# Patient Record
Sex: FEMALE | Race: WHITE | NOT HISPANIC OR LATINO | URBAN - METROPOLITAN AREA
[De-identification: names, ages, dates, MRNs, and addresses within clinical notes are randomized per-mention and may not be internally consistent; named-entity substitution may affect disease eponyms.]

---

## 2018-04-09 ENCOUNTER — EMERGENCY (EMERGENCY)
Facility: HOSPITAL | Age: 83
LOS: 1 days | Discharge: HOME | End: 2018-04-09
Attending: EMERGENCY MEDICINE | Admitting: EMERGENCY MEDICINE
Payer: MEDICARE

## 2018-04-09 ENCOUNTER — INPATIENT (INPATIENT)
Facility: HOSPITAL | Age: 83
LOS: 1 days | Discharge: HOME | DRG: 948 | End: 2018-04-11
Attending: INTERNAL MEDICINE | Admitting: HOSPITALIST
Payer: MEDICARE

## 2018-04-09 VITALS
HEIGHT: 60 IN | HEART RATE: 86 BPM | SYSTOLIC BLOOD PRESSURE: 156 MMHG | OXYGEN SATURATION: 99 % | TEMPERATURE: 98 F | WEIGHT: 169.98 LBS | RESPIRATION RATE: 18 BRPM | DIASTOLIC BLOOD PRESSURE: 89 MMHG

## 2018-04-09 VITALS
RESPIRATION RATE: 17 BRPM | HEART RATE: 100 BPM | DIASTOLIC BLOOD PRESSURE: 74 MMHG | OXYGEN SATURATION: 98 % | SYSTOLIC BLOOD PRESSURE: 132 MMHG

## 2018-04-09 VITALS
OXYGEN SATURATION: 100 % | RESPIRATION RATE: 18 BRPM | DIASTOLIC BLOOD PRESSURE: 82 MMHG | SYSTOLIC BLOOD PRESSURE: 137 MMHG | TEMPERATURE: 98 F | HEART RATE: 98 BPM

## 2018-04-09 LAB
ALBUMIN SERPL ELPH-MCNC: 4 G/DL — SIGNIFICANT CHANGE UP (ref 3.3–5.2)
ALBUMIN SERPL ELPH-MCNC: 4.1 G/DL — SIGNIFICANT CHANGE UP (ref 3.3–5.2)
ALP SERPL-CCNC: 75 U/L — SIGNIFICANT CHANGE UP (ref 40–120)
ALP SERPL-CCNC: 75 U/L — SIGNIFICANT CHANGE UP (ref 40–120)
ALT FLD-CCNC: 12 U/L — SIGNIFICANT CHANGE UP
ALT FLD-CCNC: 13 U/L — SIGNIFICANT CHANGE UP
ANION GAP SERPL CALC-SCNC: 16 MMOL/L — SIGNIFICANT CHANGE UP (ref 5–17)
ANION GAP SERPL CALC-SCNC: 19 MMOL/L — HIGH (ref 5–17)
ANISOCYTOSIS BLD QL: SLIGHT — SIGNIFICANT CHANGE UP
APTT BLD: 147.8 SEC — CRITICAL HIGH (ref 27.5–37.4)
APTT BLD: 162.8 SEC — CRITICAL HIGH (ref 27.5–37.4)
AST SERPL-CCNC: 18 U/L — SIGNIFICANT CHANGE UP
AST SERPL-CCNC: 23 U/L — SIGNIFICANT CHANGE UP
BASOPHILS # BLD AUTO: 0 K/UL — SIGNIFICANT CHANGE UP (ref 0–0.2)
BASOPHILS # BLD AUTO: 0 K/UL — SIGNIFICANT CHANGE UP (ref 0–0.2)
BASOPHILS NFR BLD AUTO: 0.2 % — SIGNIFICANT CHANGE UP (ref 0–2)
BASOPHILS NFR BLD AUTO: 0.2 % — SIGNIFICANT CHANGE UP (ref 0–2)
BILIRUB SERPL-MCNC: 1.3 MG/DL — SIGNIFICANT CHANGE UP (ref 0.4–2)
BILIRUB SERPL-MCNC: 1.6 MG/DL — SIGNIFICANT CHANGE UP (ref 0.4–2)
BLD GP AB SCN SERPL QL: SIGNIFICANT CHANGE UP
BUN SERPL-MCNC: 15 MG/DL — SIGNIFICANT CHANGE UP (ref 8–20)
BUN SERPL-MCNC: 18 MG/DL — SIGNIFICANT CHANGE UP (ref 8–20)
CALCIUM SERPL-MCNC: 9.3 MG/DL — SIGNIFICANT CHANGE UP (ref 8.6–10.2)
CALCIUM SERPL-MCNC: 9.5 MG/DL — SIGNIFICANT CHANGE UP (ref 8.6–10.2)
CHLORIDE SERPL-SCNC: 94 MMOL/L — LOW (ref 98–107)
CHLORIDE SERPL-SCNC: 97 MMOL/L — LOW (ref 98–107)
CK SERPL-CCNC: 92 U/L — SIGNIFICANT CHANGE UP (ref 25–170)
CO2 SERPL-SCNC: 24 MMOL/L — SIGNIFICANT CHANGE UP (ref 22–29)
CO2 SERPL-SCNC: 26 MMOL/L — SIGNIFICANT CHANGE UP (ref 22–29)
CREAT SERPL-MCNC: 0.62 MG/DL — SIGNIFICANT CHANGE UP (ref 0.5–1.3)
CREAT SERPL-MCNC: 0.71 MG/DL — SIGNIFICANT CHANGE UP (ref 0.5–1.3)
EOSINOPHIL # BLD AUTO: 0 K/UL — SIGNIFICANT CHANGE UP (ref 0–0.5)
EOSINOPHIL # BLD AUTO: 0 K/UL — SIGNIFICANT CHANGE UP (ref 0–0.5)
EOSINOPHIL NFR BLD AUTO: 0.2 % — SIGNIFICANT CHANGE UP (ref 0–5)
EOSINOPHIL NFR BLD AUTO: 0.4 % — SIGNIFICANT CHANGE UP (ref 0–6)
GLUCOSE SERPL-MCNC: 160 MG/DL — HIGH (ref 70–115)
GLUCOSE SERPL-MCNC: 164 MG/DL — HIGH (ref 70–115)
HCT VFR BLD CALC: 35.3 % — LOW (ref 37–47)
HCT VFR BLD CALC: 37.2 % — SIGNIFICANT CHANGE UP (ref 37–47)
HGB BLD-MCNC: 11.7 G/DL — LOW (ref 12–16)
HGB BLD-MCNC: 12.1 G/DL — SIGNIFICANT CHANGE UP (ref 12–16)
INR BLD: >15 RATIO — CRITICAL HIGH (ref 0.88–1.16)
INR BLD: >15 RATIO — SIGNIFICANT CHANGE UP (ref 0.88–1.16)
LYMPHOCYTES # BLD AUTO: 0.8 K/UL — LOW (ref 1–4.8)
LYMPHOCYTES # BLD AUTO: 1 K/UL — SIGNIFICANT CHANGE UP (ref 1–4.8)
LYMPHOCYTES # BLD AUTO: 6.3 % — LOW (ref 20–55)
LYMPHOCYTES # BLD AUTO: 9.4 % — LOW (ref 20–55)
MACROCYTES BLD QL: SLIGHT — SIGNIFICANT CHANGE UP
MAGNESIUM SERPL-MCNC: 1.9 MG/DL — SIGNIFICANT CHANGE UP (ref 1.6–2.6)
MCHC RBC-ENTMCNC: 30 PG — SIGNIFICANT CHANGE UP (ref 27–31)
MCHC RBC-ENTMCNC: 30.3 PG — SIGNIFICANT CHANGE UP (ref 27–31)
MCHC RBC-ENTMCNC: 32.5 G/DL — SIGNIFICANT CHANGE UP (ref 32–36)
MCHC RBC-ENTMCNC: 33.1 G/DL — SIGNIFICANT CHANGE UP (ref 32–36)
MCV RBC AUTO: 91.5 FL — SIGNIFICANT CHANGE UP (ref 81–99)
MCV RBC AUTO: 92.3 FL — SIGNIFICANT CHANGE UP (ref 81–99)
MICROCYTES BLD QL: SLIGHT — SIGNIFICANT CHANGE UP
MONOCYTES # BLD AUTO: 0.9 K/UL — HIGH (ref 0–0.8)
MONOCYTES # BLD AUTO: 0.9 K/UL — HIGH (ref 0–0.8)
MONOCYTES NFR BLD AUTO: 7 % — SIGNIFICANT CHANGE UP (ref 3–10)
MONOCYTES NFR BLD AUTO: 9.3 % — SIGNIFICANT CHANGE UP (ref 3–10)
NEUTROPHILS # BLD AUTO: 11.3 K/UL — HIGH (ref 1.8–8)
NEUTROPHILS # BLD AUTO: 8.2 K/UL — HIGH (ref 1.8–8)
NEUTROPHILS NFR BLD AUTO: 80.4 % — HIGH (ref 37–73)
NEUTROPHILS NFR BLD AUTO: 86.1 % — HIGH (ref 37–73)
NT-PROBNP SERPL-SCNC: 661 PG/ML — HIGH (ref 0–300)
OVALOCYTES BLD QL SMEAR: SLIGHT — SIGNIFICANT CHANGE UP
PLAT MORPH BLD: NORMAL — SIGNIFICANT CHANGE UP
PLATELET # BLD AUTO: 226 K/UL — SIGNIFICANT CHANGE UP (ref 150–400)
PLATELET # BLD AUTO: 236 K/UL — SIGNIFICANT CHANGE UP (ref 150–400)
POIKILOCYTOSIS BLD QL AUTO: SLIGHT — SIGNIFICANT CHANGE UP
POTASSIUM SERPL-MCNC: 3.4 MMOL/L — LOW (ref 3.5–5.3)
POTASSIUM SERPL-MCNC: 3.6 MMOL/L — SIGNIFICANT CHANGE UP (ref 3.5–5.3)
POTASSIUM SERPL-SCNC: 3.4 MMOL/L — LOW (ref 3.5–5.3)
POTASSIUM SERPL-SCNC: 3.6 MMOL/L — SIGNIFICANT CHANGE UP (ref 3.5–5.3)
PROT SERPL-MCNC: 7 G/DL — SIGNIFICANT CHANGE UP (ref 6.6–8.7)
PROT SERPL-MCNC: 7.2 G/DL — SIGNIFICANT CHANGE UP (ref 6.6–8.7)
PROTHROM AB SERPL-ACNC: >200 SEC — HIGH (ref 9.8–12.7)
PROTHROM AB SERPL-ACNC: >200 SEC — HIGH (ref 9.8–12.7)
RBC # BLD: 3.86 M/UL — LOW (ref 4.4–5.2)
RBC # BLD: 4.03 M/UL — LOW (ref 4.4–5.2)
RBC # FLD: 13.3 % — SIGNIFICANT CHANGE UP (ref 11–15.6)
RBC # FLD: 13.4 % — SIGNIFICANT CHANGE UP (ref 11–15.6)
RBC BLD AUTO: ABNORMAL
SODIUM SERPL-SCNC: 136 MMOL/L — SIGNIFICANT CHANGE UP (ref 135–145)
SODIUM SERPL-SCNC: 140 MMOL/L — SIGNIFICANT CHANGE UP (ref 135–145)
TROPONIN T SERPL-MCNC: <0.01 NG/ML — SIGNIFICANT CHANGE UP (ref 0–0.06)
TROPONIN T SERPL-MCNC: <0.01 NG/ML — SIGNIFICANT CHANGE UP (ref 0–0.06)
TSH SERPL-MCNC: 1.99 UIU/ML — SIGNIFICANT CHANGE UP (ref 0.27–4.2)
TYPE + AB SCN PNL BLD: SIGNIFICANT CHANGE UP
WBC # BLD: 10.2 K/UL — SIGNIFICANT CHANGE UP (ref 4.8–10.8)
WBC # BLD: 13.2 K/UL — HIGH (ref 4.8–10.8)
WBC # FLD AUTO: 10.2 K/UL — SIGNIFICANT CHANGE UP (ref 4.8–10.8)
WBC # FLD AUTO: 13.2 K/UL — HIGH (ref 4.8–10.8)

## 2018-04-09 PROCEDURE — 93306 TTE W/DOPPLER COMPLETE: CPT | Mod: 26

## 2018-04-09 PROCEDURE — 99285 EMERGENCY DEPT VISIT HI MDM: CPT | Mod: 25

## 2018-04-09 PROCEDURE — 93010 ELECTROCARDIOGRAM REPORT: CPT

## 2018-04-09 PROCEDURE — 71045 X-RAY EXAM CHEST 1 VIEW: CPT | Mod: 26

## 2018-04-09 PROCEDURE — 99285 EMERGENCY DEPT VISIT HI MDM: CPT

## 2018-04-09 RX ORDER — PHYTONADIONE (VIT K1) 5 MG
10 TABLET ORAL ONCE
Qty: 0 | Refills: 0 | Status: COMPLETED | OUTPATIENT
Start: 2018-04-09 | End: 2018-04-09

## 2018-04-09 RX ORDER — ASPIRIN/CALCIUM CARB/MAGNESIUM 324 MG
325 TABLET ORAL ONCE
Qty: 0 | Refills: 0 | Status: COMPLETED | OUTPATIENT
Start: 2018-04-09 | End: 2018-04-09

## 2018-04-09 RX ORDER — SODIUM CHLORIDE 9 MG/ML
3 INJECTION INTRAMUSCULAR; INTRAVENOUS; SUBCUTANEOUS EVERY 8 HOURS
Qty: 0 | Refills: 0 | Status: DISCONTINUED | OUTPATIENT
Start: 2018-04-09 | End: 2018-04-11

## 2018-04-09 RX ADMIN — Medication 325 MILLIGRAM(S): at 15:46

## 2018-04-09 RX ADMIN — Medication 10 MILLIGRAM(S): at 18:55

## 2018-04-09 RX ADMIN — SODIUM CHLORIDE 3 MILLILITER(S): 9 INJECTION INTRAMUSCULAR; INTRAVENOUS; SUBCUTANEOUS at 21:42

## 2018-04-09 NOTE — ED PROVIDER NOTE - PROGRESS NOTE DETAILS
Lengthy d/w family and patient regarding risks related to syncopal event today as well as supratherapeutic INR; strongly advised patient o stay for both syncpe work up as well as FFP and VitK; patient declines, she understands all risks and benefits and adamantly refuses to stay; she demonstrates full capacity, family  and daughters at bedside also trying to convince her to stay, witness to conversation, will d/c TESSY

## 2018-04-09 NOTE — ED PROVIDER NOTE - CARE PLAN
Principal Discharge DX:	Syncope and collapse  Secondary Diagnosis:	INR (international normal ratio) abnormal

## 2018-04-09 NOTE — ED ADULT NURSE REASSESSMENT NOTE - NS ED NURSE REASSESS COMMENT FT1
pt ret'd from [facility]   placed back on CM   famiyl at bedside  pt states 'I am not staying here tonight, don't let me get comfortable''  awaiting MD

## 2018-04-09 NOTE — ED ADULT TRIAGE NOTE - CHIEF COMPLAINT QUOTE
patient biba from home states that she was here earlier today and left because she didn't want to stay patient states that when she got home she was still bleeding from her IV site, patient no longer bleeding at this time, dressing intact by EMS

## 2018-04-09 NOTE — ED PROVIDER NOTE - OBJECTIVE STATEMENT
86 y/o F poor historian presents to ED s/p syncopal episode. Pt  felt well until early this afternoon. She was at the  casino with  her  sitting suddenly felt weak, fatigue and SOB prior to episode. All she remembers is waking up feeling SOB; which is current complaint. Denies headache, chest pain, abdominal pain, nausea, vomiting. No further complaints at this time. 86 y/o F poor historian presents to ED s/p syncopal episode. Pt  felt well until early this afternoon. She was at the  casino with  her  sitting suddenly felt weak, fatigue and SOB prior to episode. All she remembers is waking up feeling SOB; which is current complaint. Pt is on coumadin (over 7 years). Denies headache, chest pain, abdominal pain, nausea, vomiting. No further complaints at this time.

## 2018-04-09 NOTE — ED ADULT TRIAGE NOTE - CHIEF COMPLAINT QUOTE
pt alert and awake x3, c/o 2 episodes of syncope and chest discomfort, unknown blood thinners, denies hitting head.

## 2018-04-09 NOTE — ED PROVIDER NOTE - MEDICAL DECISION MAKING DETAILS
Pt will be need to be admitted for management of elevated INR. Pt will be need to be admitted for management of significantly elevated INR and episode of syncope

## 2018-04-09 NOTE — ED ADULT NURSE NOTE - OBJECTIVE STATEMENT
Assumed pt care at 2130.  Pt a&ox3 with VSS.  Pt was previously here earlier today r/t syncopal episode.  Pt left AMA after she was educated on INR >15 and risks of leaving.  Pt stated she got home from leaving AMA and began profusely bleeding from IV site.  Pt denies any c/o pain, no acute s/s of respiratory distress noted or reported, will continue to monitor

## 2018-04-09 NOTE — ED PROVIDER NOTE - OBJECTIVE STATEMENT
86 y/o F BIBA presents to ED c/o wound to the RUE today. Pt left AMA from [facility] today when she began bleeding profusely from the IV site. Bleeding controlled after pressure dressing applied by EMS. Pt was seen at [facility] for syncopal episode. Denies dizziness, fever, lightheadedness, SOB, N/V or any other complaints at this time 86 y/o F w/ PMHx of A-fib BIBA presents to ED c/o wound to the RUE today. Pt left AMA from [facility] today when she began bleeding profusely from the IV site. Bleeding controlled after pressure dressing applied by EMS. Pt was seen at [facility] for syncopal episode, preceded by SOB, She notes her Warfarin has been changed recently from 2.5mg to 7.5 mg without having INR checked for several weeks. Denies dizziness, fever, lightheadedness, SOB, N/V or any other complaints at this time. Cardiologist: Kp Velazco. PMD: Dr. Rojas

## 2018-04-09 NOTE — ED ADULT NURSE NOTE - OBJECTIVE STATEMENT
Assumed pt care at 1530.  Pt a&ox3 with VSS, pt states she "blacked out" as per her .  Pt states she was at the slot machine at [employer] and just felt nauseas, pt  states her eyes rolled back and she began to fall off chair but he caught her.  Pt denies any symptoms at this time , denies any c/o pain, no acute s/s of respiratory distress noted or reported, will continue to monitor.  Pt states she takes 8medications daily, poor historian, unable to state medical history or full medication list

## 2018-04-10 DIAGNOSIS — R55 SYNCOPE AND COLLAPSE: ICD-10-CM

## 2018-04-10 DIAGNOSIS — Z29.9 ENCOUNTER FOR PROPHYLACTIC MEASURES, UNSPECIFIED: ICD-10-CM

## 2018-04-10 DIAGNOSIS — I48.91 UNSPECIFIED ATRIAL FIBRILLATION: ICD-10-CM

## 2018-04-10 DIAGNOSIS — I10 ESSENTIAL (PRIMARY) HYPERTENSION: ICD-10-CM

## 2018-04-10 DIAGNOSIS — R79.1 ABNORMAL COAGULATION PROFILE: ICD-10-CM

## 2018-04-10 DIAGNOSIS — E11.9 TYPE 2 DIABETES MELLITUS WITHOUT COMPLICATIONS: ICD-10-CM

## 2018-04-10 LAB
ABO RH CONFIRMATION: SIGNIFICANT CHANGE UP
ALBUMIN SERPL ELPH-MCNC: 3.9 G/DL — SIGNIFICANT CHANGE UP (ref 3.3–5.2)
ALP SERPL-CCNC: 70 U/L — SIGNIFICANT CHANGE UP (ref 40–120)
ALT FLD-CCNC: 11 U/L — SIGNIFICANT CHANGE UP
ANION GAP SERPL CALC-SCNC: 15 MMOL/L — SIGNIFICANT CHANGE UP (ref 5–17)
APTT BLD: 41.3 SEC — HIGH (ref 27.5–37.4)
AST SERPL-CCNC: 17 U/L — SIGNIFICANT CHANGE UP
BASOPHILS # BLD AUTO: 0 K/UL — SIGNIFICANT CHANGE UP (ref 0–0.2)
BASOPHILS NFR BLD AUTO: 0.3 % — SIGNIFICANT CHANGE UP (ref 0–2)
BILIRUB SERPL-MCNC: 2.2 MG/DL — HIGH (ref 0.4–2)
BUN SERPL-MCNC: 15 MG/DL — SIGNIFICANT CHANGE UP (ref 8–20)
CALCIUM SERPL-MCNC: 9.2 MG/DL — SIGNIFICANT CHANGE UP (ref 8.6–10.2)
CHLORIDE SERPL-SCNC: 98 MMOL/L — SIGNIFICANT CHANGE UP (ref 98–107)
CO2 SERPL-SCNC: 27 MMOL/L — SIGNIFICANT CHANGE UP (ref 22–29)
CREAT SERPL-MCNC: 0.44 MG/DL — LOW (ref 0.5–1.3)
EOSINOPHIL # BLD AUTO: 0.1 K/UL — SIGNIFICANT CHANGE UP (ref 0–0.5)
EOSINOPHIL NFR BLD AUTO: 0.9 % — SIGNIFICANT CHANGE UP (ref 0–6)
GLUCOSE BLDC GLUCOMTR-MCNC: 139 MG/DL — HIGH (ref 70–99)
GLUCOSE BLDC GLUCOMTR-MCNC: 140 MG/DL — HIGH (ref 70–99)
GLUCOSE BLDC GLUCOMTR-MCNC: 153 MG/DL — HIGH (ref 70–99)
GLUCOSE SERPL-MCNC: 129 MG/DL — HIGH (ref 70–115)
HCT VFR BLD CALC: 29.4 % — LOW (ref 37–47)
HCT VFR BLD CALC: 31.9 % — LOW (ref 37–47)
HGB BLD-MCNC: 10.4 G/DL — LOW (ref 12–16)
HGB BLD-MCNC: 9.7 G/DL — LOW (ref 12–16)
INR BLD: 2.19 RATIO — HIGH (ref 0.88–1.16)
LYMPHOCYTES # BLD AUTO: 0.9 K/UL — LOW (ref 1–4.8)
LYMPHOCYTES # BLD AUTO: 12.1 % — LOW (ref 20–55)
MAGNESIUM SERPL-MCNC: 2 MG/DL — SIGNIFICANT CHANGE UP (ref 1.6–2.6)
MCHC RBC-ENTMCNC: 29.9 PG — SIGNIFICANT CHANGE UP (ref 27–31)
MCHC RBC-ENTMCNC: 33 G/DL — SIGNIFICANT CHANGE UP (ref 32–36)
MCV RBC AUTO: 90.7 FL — SIGNIFICANT CHANGE UP (ref 81–99)
MONOCYTES # BLD AUTO: 0.9 K/UL — HIGH (ref 0–0.8)
MONOCYTES NFR BLD AUTO: 11.6 % — HIGH (ref 3–10)
NEUTROPHILS # BLD AUTO: 5.7 K/UL — SIGNIFICANT CHANGE UP (ref 1.8–8)
NEUTROPHILS NFR BLD AUTO: 74.8 % — HIGH (ref 37–73)
PHOSPHATE SERPL-MCNC: 2.2 MG/DL — LOW (ref 2.4–4.7)
PLATELET # BLD AUTO: 186 K/UL — SIGNIFICANT CHANGE UP (ref 150–400)
POTASSIUM SERPL-MCNC: 3.2 MMOL/L — LOW (ref 3.5–5.3)
POTASSIUM SERPL-SCNC: 3.2 MMOL/L — LOW (ref 3.5–5.3)
PROT SERPL-MCNC: 6.8 G/DL — SIGNIFICANT CHANGE UP (ref 6.6–8.7)
PROTHROM AB SERPL-ACNC: 24.5 SEC — HIGH (ref 9.8–12.7)
RBC # BLD: 3.24 M/UL — LOW (ref 4.4–5.2)
RBC # FLD: 13.4 % — SIGNIFICANT CHANGE UP (ref 11–15.6)
SODIUM SERPL-SCNC: 140 MMOL/L — SIGNIFICANT CHANGE UP (ref 135–145)
WBC # BLD: 7.6 K/UL — SIGNIFICANT CHANGE UP (ref 4.8–10.8)
WBC # FLD AUTO: 7.6 K/UL — SIGNIFICANT CHANGE UP (ref 4.8–10.8)

## 2018-04-10 PROCEDURE — 70450 CT HEAD/BRAIN W/O DYE: CPT | Mod: 26

## 2018-04-10 PROCEDURE — 93010 ELECTROCARDIOGRAM REPORT: CPT

## 2018-04-10 PROCEDURE — 99223 1ST HOSP IP/OBS HIGH 75: CPT | Mod: GC

## 2018-04-10 PROCEDURE — 12345: CPT | Mod: GC,NC

## 2018-04-10 PROCEDURE — 73610 X-RAY EXAM OF ANKLE: CPT | Mod: 26,LT

## 2018-04-10 PROCEDURE — 93880 EXTRACRANIAL BILAT STUDY: CPT | Mod: 26

## 2018-04-10 PROCEDURE — 73620 X-RAY EXAM OF FOOT: CPT | Mod: 26,LT

## 2018-04-10 RX ORDER — METOPROLOL TARTRATE 50 MG
25 TABLET ORAL
Qty: 0 | Refills: 0 | Status: DISCONTINUED | OUTPATIENT
Start: 2018-04-10 | End: 2018-04-11

## 2018-04-10 RX ORDER — METOPROLOL TARTRATE 50 MG
1 TABLET ORAL
Qty: 0 | Refills: 0 | COMMUNITY

## 2018-04-10 RX ORDER — GLUCAGON INJECTION, SOLUTION 0.5 MG/.1ML
1 INJECTION, SOLUTION SUBCUTANEOUS ONCE
Qty: 0 | Refills: 0 | Status: DISCONTINUED | OUTPATIENT
Start: 2018-04-10 | End: 2018-04-11

## 2018-04-10 RX ORDER — DEXTROSE 50 % IN WATER 50 %
25 SYRINGE (ML) INTRAVENOUS ONCE
Qty: 0 | Refills: 0 | Status: DISCONTINUED | OUTPATIENT
Start: 2018-04-10 | End: 2018-04-11

## 2018-04-10 RX ORDER — INSULIN LISPRO 100/ML
VIAL (ML) SUBCUTANEOUS
Qty: 0 | Refills: 0 | Status: DISCONTINUED | OUTPATIENT
Start: 2018-04-10 | End: 2018-04-11

## 2018-04-10 RX ORDER — DEXTROSE 50 % IN WATER 50 %
12.5 SYRINGE (ML) INTRAVENOUS ONCE
Qty: 0 | Refills: 0 | Status: DISCONTINUED | OUTPATIENT
Start: 2018-04-10 | End: 2018-04-11

## 2018-04-10 RX ORDER — DEXTROSE 50 % IN WATER 50 %
1 SYRINGE (ML) INTRAVENOUS ONCE
Qty: 0 | Refills: 0 | Status: DISCONTINUED | OUTPATIENT
Start: 2018-04-10 | End: 2018-04-11

## 2018-04-10 RX ORDER — WARFARIN SODIUM 2.5 MG/1
2 TABLET ORAL ONCE
Qty: 0 | Refills: 0 | Status: COMPLETED | OUTPATIENT
Start: 2018-04-10 | End: 2018-04-10

## 2018-04-10 RX ORDER — SODIUM CHLORIDE 9 MG/ML
1000 INJECTION, SOLUTION INTRAVENOUS
Qty: 0 | Refills: 0 | Status: DISCONTINUED | OUTPATIENT
Start: 2018-04-10 | End: 2018-04-11

## 2018-04-10 RX ORDER — SODIUM,POTASSIUM PHOSPHATES 278-250MG
1 POWDER IN PACKET (EA) ORAL ONCE
Qty: 0 | Refills: 0 | Status: COMPLETED | OUTPATIENT
Start: 2018-04-10 | End: 2018-04-11

## 2018-04-10 RX ADMIN — SODIUM CHLORIDE 3 MILLILITER(S): 9 INJECTION INTRAMUSCULAR; INTRAVENOUS; SUBCUTANEOUS at 22:04

## 2018-04-10 RX ADMIN — SODIUM CHLORIDE 3 MILLILITER(S): 9 INJECTION INTRAMUSCULAR; INTRAVENOUS; SUBCUTANEOUS at 06:00

## 2018-04-10 RX ADMIN — SODIUM CHLORIDE 3 MILLILITER(S): 9 INJECTION INTRAMUSCULAR; INTRAVENOUS; SUBCUTANEOUS at 11:51

## 2018-04-10 RX ADMIN — Medication 25 MILLIGRAM(S): at 07:47

## 2018-04-10 RX ADMIN — Medication 25 MILLIGRAM(S): at 16:32

## 2018-04-10 NOTE — H&P ADULT - HISTORY OF PRESENT ILLNESS
84 y/o F w/ PMH of A-fib (on coumadin) & DM-2 BIBA to ED presenting w/ RUE wound. Patient left AMA from [facility] ED today prematurely before work-up for syncope is complete. Patient stated that she began bleeding profusely from the IV sites after leaving AMA. Bleeding was controlled after pressure dressing was applied by EMS. Patient has been on coumadin for 7 years and states that her coumadin has recently been changed from 2.5 mg to 7.5 mg without having her INR checked for weeks. Patient denies any fever, chills, dizziness, CP, SOB, N/V, bowel or urinary changes. 86 y/o F w/ PMH of A-fib (on coumadin) & DM-2 BIBA to ED presenting w/ RUE wound. Patient left AMA from [facility] ED today prematurely before work-up for syncope is complete. Patient stated that she began bleeding profusely from the IV sites after leaving AMA. Bleeding was controlled after pressure dressing was applied by EMS. Patient has been on coumadin for 7 years and states that her coumadin has recently been changed from 2.5 mg to 7.5 mg without having her INR checked for weeks. Patient denies any fever, chills, dizziness, CP, N/V, bowel or urinary changes.     Patient was at a casino early yesterday & she had what she described as near-syncope episode. She denied any LOC, head trauma or fall. No nausea, palpitations or diaphoresis. Patient denied similar episodes in the past. 84 y/o F w/ PMH of A-fib (on coumadin), DM-2 & HTN BIBA to ED presenting w/ RUE wound. Patient left AMA from [facility] ED today prematurely before work-up for syncope is complete. Patient stated that she began bleeding profusely from the IV sites after leaving AMA. Bleeding was controlled after pressure dressing was applied by EMS. Patient has been on coumadin for 7 years and states that her coumadin has recently been changed from 2.5 mg to 7.5 mg without having her INR checked for weeks. Patient denies any fever, chills, dizziness, CP, N/V, bowel or urinary changes.     Patient was at a casino early yesterday & she had what she described as near-syncope episode. She denied any LOC, head trauma or fall. No nausea, palpitations or diaphoresis. Patient denied similar episodes in the past.

## 2018-04-10 NOTE — H&P ADULT - NSHPREVIEWOFSYSTEMS_GEN_ALL_CORE
positive: bleeding  negative: fever, chills, dizziness, CP, SOB, N/V, bowel or urinary changes. positive: bleeding, SOB  negative: fever, chills, dizziness, CP, SOB, N/V, bowel or urinary changes.

## 2018-04-10 NOTE — H&P ADULT - NSHPPHYSICALEXAM_GEN_ALL_CORE
T(F): 97.6  HR: 98  BP: 137/82  RR: 18  SpO2: 100% RA  Wt(kg): --    Physical Exam:   GENERAL: well-groomed, well-developed, NAD  HEENT: head NC/AT; EOM intact, PERRLA, conjunctiva & sclera clear; hearing grossly intact, normal TM ; no nasal congestion or discharge, no sinus tenderness, no tonsillar erythema or exudates, moist mucous membranes, good dentition  NECK: supple, no JVD, no thyromegaly  RESPIRATORY: CTA B/L, no wheezing, rales, rhonchi or rubs  CARDIOVASCULAR: S1&S2, RRR, no murmurs or gallops  ABDOMEN: soft, non-tender, non-distended, BS+, no hernias, no hepatosplenomegaly, no CVA tenderness  MUSCULOSKELETAL: no muscle atrophy, no clubbing, cyanosis or edema of extremities, no calf tenderness   LYMPH: no lymphadenopathy  VASCULAR: peripheral pulses 2+, capillary refill < 2 seconds,   SKIN: left ankle bruising   NEUROLOGIC: AA&O X3, Cno focal deficits, no sensory loss, motor Strength 5/5 in UE & LE B/L, DTRs 2+/4 intact B/L, normal gait

## 2018-04-10 NOTE — CHART NOTE - NSCHARTNOTEFT_GEN_A_CORE
Spoke with [facility] and made an appointment for the patient with Dr Block at 9:30am on 4/13 for INR check.    Address: [address] [place] [address]  Phone: [phone]

## 2018-04-10 NOTE — CHART NOTE - NSCHARTNOTEFT_GEN_A_CORE
Patient seen and examined at bedside. No acute events overnight. Patient states she is feeling well, has no complaints. Reports that when she came to the ER the day prior it was because she has an episode where she did not feel well thought she might faint and then when she got up off the chair her foot got caught on something and she fell thereafter had a swollen left ankle. They wanted to do a syncope work up but she left the ER, she had a peripheral IV when she removed her band aide at home she was unable to stop the bleeding. She came back to the ER and was noted to have elevated INR, she reports she was given an increased dose of the coumadin that she continued to take and was having sx of nose bleeding for the past 3 days. No other noted bleeding in her urine or stool. Patient denies chest pain, SOB, abd pain, N/V, fever, chills, dysuria or any other complaints. All remainder ROS negative.       Vital Signs Last 24 Hrs  T(C): 36.8 (10 Apr 2018 16:25), Max: 36.8 (10 Apr 2018 16:25)  T(F): 98.2 (10 Apr 2018 16:25), Max: 98.2 (10 Apr 2018 16:25)  HR: 92 (10 Apr 2018 16:25) (90 - 108)  BP: 123/79 (10 Apr 2018 16:25) (119/66 - 137/82)  BP(mean): --  RR: 17 (10 Apr 2018 16:25) (17 - 20)  SpO2: 95% (10 Apr 2018 16:25) (91% - 100%)      Physical Exam:  Constitutional: WD WN NAD obese  Cardio: S1S2 irregular irregular no mrg   Resp: CTA b/l no WRR   Gastro: soft nt nd bs + normoactive  ext: left ankle swelling, ROM intact  RUE antecubital echymosis slight hematoma around prior IV site no active bleeding noted        A/P:  86 y/o F w/ HX of A-fib (on coumadin), DM-2 & HTN, recently left AMA from ER day prior to current hospitalization s/p syncopal episode refusing full work up; currently BIBA to ED presenting w/ RUE wound found to have supra-therapeutic INR (>15) S/P 2 FFP with down trending of her INR to 2.19. Upon further d/w patients outpt PMD Dr Velázquez patient was last seen in the office in January 2018. During that visit, patient was seen by a PA, INR was 2.6, and she was prescribed coumadin 7.5mg on 1/9/2018 (90 pills, 1 refill) by the PA. Patient was non-compliant with her INR checks and has not been seen by her PMD since the visit in Jan 2018. Hb measured at that visit was 14.3. Patient reports non compliance and felt it was not necessary to check her INRs. D/w patient that we will start low dose coumadin and that she will need appropriate follow up to ensure INR remains in the correct range. Pt wanted to again leave AMA but reported she would stay once daughter at bedside to ensure appropriate management in regards to her AC for a fib was completed. Pt to have 2 mg coumadin tonight and c/w metoprolol 25mg po bid for rate control for A fib. If any episodes of bleeding recur will halt coumadin therapy. Pt counseled on better compliance and follow up with the medication and daughter reports that she will monitor her mother closely. Follow up appt with new cardiologist Dr. Block made for this friday for INR check. Will monitor patient in house for 24 hours on coumadin therapy to see response to coumadin prior to discharging home. Syncope with work up with ekg showing non specific st changes, negative ct head, carotid US with no significant stenosis but incidental finding on tte of moderate aortic valve stenosis and mobile echodensity in the ascending aorta apprears to be artifactual vs mobile plaque, which may need further evaluation with brando. Cardiology consulted. HTN c/w metoprolol bp stable at this time. DM2 will f/u HBA1C fs stable c/w HSS and hypoglycemia protocol. Baseline hgb in jan 2018 was 14 currently on day prior to current hospitalization in the ER hgb ~11-12 currently down to 9.7, may be acute on chronic anemia will follow up anemia panel, concern that acute blood loss from RUE prior peripheral IV site bleeding contributing to current loss. No active bleeding so h/h should recover. Occult stool ordered. Hypokalemia and hypophosphatemia replaced with potassium phosphate x 1 dose and will f/u electrolytes in the am. Left ankle swelling s/p fall likely sprain, x ray of the left foot showed no evidence of fx. Will c/w supportive care with warm packs and ace wrap. DVT ppx covered on coumadin.

## 2018-04-10 NOTE — H&P ADULT - PROBLEM SELECTOR PLAN 1
Admit to medicine-resident service under Dr. Bell  Bed: Telemetry  Diet: carb-consistent diet  Activity: ambulate w/ assistance  Nursing: vitals per routine  Labs: CBC, CMP, PT/INR, PTT, type & screen, troponins x 3  Imaging: head CT negative for gross acute intracranial hemorrhage or mass effect, TTE pending Admit to medicine-resident service under Dr. Bell  Bed: Telemetry  Diet: carb-consistent diet  Activity: ambulate w/ assistance  Nursing: vitals per routine  Labs: CBC, CMP, PT/INR, PTT, type & screen, lipid panel  Imaging: head CT negative for gross acute intracranial hemorrhage or mass effect, TTE pending Admit to medicine-resident service under Dr. Bell  Bed: Telemetry  Diet: carb-consistent diet  Activity: ambulate w/ assistance  Nursing: vitals per routine  Labs: CBC, CMP, PT/INR, PTT, type & screen, lipid panel  Imaging: head CT negative for gross acute intracranial hemorrhage or mass effect, TTE & Carotid US pending

## 2018-04-10 NOTE — CHART NOTE - NSCHARTNOTEFT_GEN_A_CORE
Spoke with patient's PMD Dr Velázquez ([phone]). As per PMD, patient was last seen in the office in January 2018. During that visit, patient was seen by a PA, INR was 2.6, and she was prescribed coumadin 7.5mg on 1/9/2018 (90 pills, 1 refill). Patient was non-compliant with her INR checks and has not been seen by her PMD since the visit in Jan 2018. Hb measured at that visit was 14.3. Spoke with patient's PMD Dr Velázquez ([phone]). As per PMD, patient was last seen in the office in January 2018. During that visit, patient was seen by a PA, INR was 2.6, and she was prescribed coumadin 7.5mg on 1/9/2018 (90 pills, 1 refill) by the PA. Patient was non-compliant with her INR checks and has not been seen by her PMD since the visit in Jan 2018. Hb measured at that visit was 14.3.

## 2018-04-10 NOTE — H&P ADULT - ASSESSMENT
84 y/o F w/ PMH of A-fib (on coumadin) & DM-2 BIBA to ED presenting w/ RUE wound found to have supra-therapeutic INR (>15) 86 y/o F w/ PMH of A-fib (on coumadin), DM-2 & HTN BIBA to ED presenting w/ RUE wound found to have supra-therapeutic INR (>15)

## 2018-04-10 NOTE — CONSULT NOTE ADULT - SUBJECTIVE AND OBJECTIVE BOX
[facility]                                   [address] [place] [address]                                                      PHONE: [phone]                                                         FAX: [phone]  [email or web address]  ---------------------------------------------------------------------------------------------------------------------------------    Reason for Consult: Possible aortic echodensity  [facility]: [facility]  HPI:  KEVIN HAMILTON is an 85y Female PMhx HTN, DM, nonobstructive CAD on cath 2013, negative EPS in the past for frequent PVCs, HFpEF, Afib on coumadin, recently left [facility] and was discharged but did not follow for coumadin management.  Patient admitted for syncope but she is unable to give a history of this event.  An echo performed showed an aortic echodensity which is likely artifact. Pt notes left ankle pain from a recent sprain.    PAST MEDICAL & SURGICAL HISTORY:  HTN (hypertension)  Diabetes      No Known Allergies      MEDICATIONS  (STANDING):  dextrose 5%. 1000 milliLiter(s) (50 mL/Hr) IV Continuous <Continuous>  dextrose 50% Injectable 12.5 Gram(s) IV Push once  dextrose 50% Injectable 25 Gram(s) IV Push once  dextrose 50% Injectable 25 Gram(s) IV Push once  insulin lispro (HumaLOG) corrective regimen sliding scale   SubCutaneous Before meals and at bedtime  metoprolol tartrate 25 milliGRAM(s) Oral two times a day  potassium phosphate / sodium phosphate powder 1 Packet(s) Oral once  sodium chloride 0.9% lock flush 3 milliLiter(s) IV Push every 8 hours  warfarin 2 milliGRAM(s) Oral once    MEDICATIONS  (PRN):  dextrose Gel 1 Dose(s) Oral once PRN Blood Glucose LESS THAN 70 milliGRAM(s)/deciliter  glucagon  Injectable 1 milliGRAM(s) IntraMuscular once PRN Glucose LESS THAN 70 milligrams/deciliter      Social History:  Cigarettes:            no        Alchohol:                no Illicit Drug Abuse:  no  FHX NC  ROS: Negative other than as mentioned in HPI.    Vital Signs Last 24 Hrs  T(C): 36.8 (10 Apr 2018 16:25), Max: 36.8 (10 Apr 2018 16:25)  T(F): 98.2 (10 Apr 2018 16:25), Max: 98.2 (10 Apr 2018 16:25)  HR: 92 (10 Apr 2018 16:25) (90 - 108)  BP: 123/79 (10 Apr 2018 16:25) (119/66 - 137/82)  BP(mean): --  RR: 17 (10 Apr 2018 16:25) (17 - 20)  SpO2: 95% (10 Apr 2018 16:25) (91% - 100%)  ICU Vital Signs Last 24 Hrs  KEVIN HAMILTON  I&O's Detail    I&O's Summary    Drug Dosing Weight  KEVIN HAMILTON      PHYSICAL EXAM:  General: Appears well developed, well nourished alert and cooperative.  HEENT: Head; normocephalic, atraumatic.  Eyes: Pupils reactive, cornea wnl.  Neck: Supple, no nodes adenopathy, no NVD or carotid bruit or thyromegaly.  CARDIOVASCULAR: irregular, No murmur, rub, gallop or lift.   LUNGS: No rales, rhonchi or wheeze. Normal breath sounds bilaterally.  ABDOMEN: Soft, nontender without mass or organomegaly. bowel sounds normoactive.  EXTREMITIES: No clubbing, cyanosis or edema. Distal pulses wnl.   SKIN: warm and dry with normal turgor.  NEURO: Alert/oriented x 3/normal motor exam. No pathologic reflexes.    PSYCH: normal affect.        LABS:                        10.4   x     )-----------( x        ( 10 Apr 2018 17:29 )             31.9     04-10    140  |  98  |  15.0  ----------------------------<  129<H>  3.2<L>   |  27.0  |  0.44<L>    Ca    9.2      10 Apr 2018 07:50  Phos  2.2     04-10  Mg     2.0     04-10    TPro  6.8  /  Alb  3.9  /  TBili  2.2<H>  /  DBili  x   /  AST  17  /  ALT  11  /  AlkPhos  70  04-10    KEVIN HAMILTON  CARDIAC MARKERS ( 09 Apr 2018 22:19 )  x     / <0.01 ng/mL / 92 U/L / x     / x      CARDIAC MARKERS ( 09 Apr 2018 15:07 )  x     / <0.01 ng/mL / x     / x     / x          PT/INR - ( 10 Apr 2018 07:50 )   PT: 24.5 sec;   INR: 2.19 ratio         PTT - ( 10 Apr 2018 07:50 )  PTT:41.3 sec      RADIOLOGY & ADDITIONAL STUDIES:    INTERPRETATION OF TELEMETRY (personally reviewed): afib no events    ECG: Afib @ 90 lateral T wave inversions     < from: TTE Echo Complete w/Doppler (04.09.18 @ 16:20) >  Summary:   1. Technically limited study.   2. Endocardial visualization was enhanced with intravenous echo contrast.   3. Normal left ventricular internal cavity size.   4. Normal global left ventricular systolic function.   5. Left ventricular ejection fraction, by visual estimation, is 65%.   6. The mitral in-flow pattern reveals no discernable A-wave, therefore   no comment on diastolic function can be made.   7. Normal right ventricular size and function.   8. Thickening of the anterior and posterior mitral valve leaflets.   9. Moderate aortic valve stenosis.  10. Mobile echodensity in the ascending aorta now well visualized,   apprears to be artifactual vs mobile plaque. Consider ZEFERINO or CTA for   further evaluation if clinically indicated.  11. Dilatation of the ascending aorta.  12. Mild mitral annular calcification.  13. Mild mitral valve regurgitation.    [id number] Alexis Reese MD, Electronically signed on 4/10/2018 at 8:39:27   AM    < end of copied text >      Assessment and Plan:  In summary, KEVIN HAMILTON is an 85y Female with past medical history significant for HTN, DM, nonobstructive CAD on cath 2013, negative EPS in the past for frequent PVCs, HFpEF, Afib on coumadin, recently left [facility] AMA and was discharged but did not follow for coumadin management.  Patient admitted for syncope but she is unable to give a history of this event.  An echo performed showed an aortic echodensity which is likely artifact. Pt notes left ankle pain from a recent sprain.    1) Will pursue CTA to ensure no aortopathy  2) DM-Start Lipitor 40mg daily  3) Outpt ischemic evaluation

## 2018-04-10 NOTE — H&P ADULT - PROBLEM SELECTOR PLAN 2
Patient has been on coumadin for 7 years and states that her coumadin has recently been changed from 2.5 mg to 7.5 mg without having her INR checked for weeks.  hold comadin for now in the setting of supra-therapeutic INR (> 15)  2 units fresh frozen plasma  f/u repeat INR

## 2018-04-11 ENCOUNTER — TRANSCRIPTION ENCOUNTER (OUTPATIENT)
Age: 83
End: 2018-04-11

## 2018-04-11 VITALS
SYSTOLIC BLOOD PRESSURE: 129 MMHG | RESPIRATION RATE: 20 BRPM | HEART RATE: 72 BPM | TEMPERATURE: 97 F | OXYGEN SATURATION: 96 % | DIASTOLIC BLOOD PRESSURE: 69 MMHG

## 2018-04-11 LAB
ANION GAP SERPL CALC-SCNC: 15 MMOL/L — SIGNIFICANT CHANGE UP (ref 5–17)
BUN SERPL-MCNC: 18 MG/DL — SIGNIFICANT CHANGE UP (ref 8–20)
CALCIUM SERPL-MCNC: 10.2 MG/DL — SIGNIFICANT CHANGE UP (ref 8.6–10.2)
CHLORIDE SERPL-SCNC: 98 MMOL/L — SIGNIFICANT CHANGE UP (ref 98–107)
CO2 SERPL-SCNC: 27 MMOL/L — SIGNIFICANT CHANGE UP (ref 22–29)
CREAT SERPL-MCNC: 0.57 MG/DL — SIGNIFICANT CHANGE UP (ref 0.5–1.3)
FERRITIN SERPL-MCNC: 294 NG/ML — SIGNIFICANT CHANGE UP (ref 11–306)
GLUCOSE BLDC GLUCOMTR-MCNC: 136 MG/DL — HIGH (ref 70–99)
GLUCOSE BLDC GLUCOMTR-MCNC: 151 MG/DL — HIGH (ref 70–99)
GLUCOSE SERPL-MCNC: 165 MG/DL — HIGH (ref 70–115)
HBA1C BLD-MCNC: 5.9 % — HIGH (ref 4–5.6)
HCT VFR BLD CALC: 31.9 % — LOW (ref 37–47)
HGB BLD-MCNC: 10.4 G/DL — LOW (ref 12–16)
INR BLD: 1.33 RATIO — HIGH (ref 0.88–1.16)
IRON SATN MFR SERPL: 29 % — SIGNIFICANT CHANGE UP (ref 14–50)
IRON SATN MFR SERPL: 97 UG/DL — SIGNIFICANT CHANGE UP (ref 37–145)
MAGNESIUM SERPL-MCNC: 2 MG/DL — SIGNIFICANT CHANGE UP (ref 1.6–2.6)
MCHC RBC-ENTMCNC: 30.1 PG — SIGNIFICANT CHANGE UP (ref 27–31)
MCHC RBC-ENTMCNC: 32.6 G/DL — SIGNIFICANT CHANGE UP (ref 32–36)
MCV RBC AUTO: 92.5 FL — SIGNIFICANT CHANGE UP (ref 81–99)
PHOSPHATE SERPL-MCNC: 3.2 MG/DL — SIGNIFICANT CHANGE UP (ref 2.4–4.7)
PLATELET # BLD AUTO: 233 K/UL — SIGNIFICANT CHANGE UP (ref 150–400)
POTASSIUM SERPL-MCNC: 4.2 MMOL/L — SIGNIFICANT CHANGE UP (ref 3.5–5.3)
POTASSIUM SERPL-SCNC: 4.2 MMOL/L — SIGNIFICANT CHANGE UP (ref 3.5–5.3)
PROTHROM AB SERPL-ACNC: 14.7 SEC — HIGH (ref 9.8–12.7)
RBC # BLD: 3.45 M/UL — LOW (ref 4.4–5.2)
RBC # FLD: 13.5 % — SIGNIFICANT CHANGE UP (ref 11–15.6)
SODIUM SERPL-SCNC: 140 MMOL/L — SIGNIFICANT CHANGE UP (ref 135–145)
TIBC SERPL-MCNC: 336 UG/DL — SIGNIFICANT CHANGE UP (ref 220–430)
TRANSFERRIN SERPL-MCNC: 235 MG/DL — SIGNIFICANT CHANGE UP (ref 192–382)
WBC # BLD: 6.6 K/UL — SIGNIFICANT CHANGE UP (ref 4.8–10.8)
WBC # FLD AUTO: 6.6 K/UL — SIGNIFICANT CHANGE UP (ref 4.8–10.8)

## 2018-04-11 PROCEDURE — 93880 EXTRACRANIAL BILAT STUDY: CPT

## 2018-04-11 PROCEDURE — 84484 ASSAY OF TROPONIN QUANT: CPT

## 2018-04-11 PROCEDURE — 80053 COMPREHEN METABOLIC PANEL: CPT

## 2018-04-11 PROCEDURE — 83880 ASSAY OF NATRIURETIC PEPTIDE: CPT

## 2018-04-11 PROCEDURE — 86901 BLOOD TYPING SEROLOGIC RH(D): CPT

## 2018-04-11 PROCEDURE — 84100 ASSAY OF PHOSPHORUS: CPT

## 2018-04-11 PROCEDURE — 82550 ASSAY OF CK (CPK): CPT

## 2018-04-11 PROCEDURE — 71045 X-RAY EXAM CHEST 1 VIEW: CPT

## 2018-04-11 PROCEDURE — 73610 X-RAY EXAM OF ANKLE: CPT

## 2018-04-11 PROCEDURE — 93306 TTE W/DOPPLER COMPLETE: CPT

## 2018-04-11 PROCEDURE — 82728 ASSAY OF FERRITIN: CPT

## 2018-04-11 PROCEDURE — 93005 ELECTROCARDIOGRAM TRACING: CPT

## 2018-04-11 PROCEDURE — 70450 CT HEAD/BRAIN W/O DYE: CPT

## 2018-04-11 PROCEDURE — 84443 ASSAY THYROID STIM HORMONE: CPT

## 2018-04-11 PROCEDURE — 86850 RBC ANTIBODY SCREEN: CPT

## 2018-04-11 PROCEDURE — 85610 PROTHROMBIN TIME: CPT

## 2018-04-11 PROCEDURE — 73620 X-RAY EXAM OF FOOT: CPT

## 2018-04-11 PROCEDURE — 84466 ASSAY OF TRANSFERRIN: CPT

## 2018-04-11 PROCEDURE — 36415 COLL VENOUS BLD VENIPUNCTURE: CPT

## 2018-04-11 PROCEDURE — 85027 COMPLETE CBC AUTOMATED: CPT

## 2018-04-11 PROCEDURE — P9059: CPT

## 2018-04-11 PROCEDURE — 83036 HEMOGLOBIN GLYCOSYLATED A1C: CPT

## 2018-04-11 PROCEDURE — 99284 EMERGENCY DEPT VISIT MOD MDM: CPT

## 2018-04-11 PROCEDURE — 80048 BASIC METABOLIC PNL TOTAL CA: CPT

## 2018-04-11 PROCEDURE — 83735 ASSAY OF MAGNESIUM: CPT

## 2018-04-11 PROCEDURE — 83550 IRON BINDING TEST: CPT

## 2018-04-11 PROCEDURE — 99239 HOSP IP/OBS DSCHRG MGMT >30: CPT

## 2018-04-11 PROCEDURE — 82962 GLUCOSE BLOOD TEST: CPT

## 2018-04-11 PROCEDURE — 86900 BLOOD TYPING SEROLOGIC ABO: CPT

## 2018-04-11 PROCEDURE — 99285 EMERGENCY DEPT VISIT HI MDM: CPT

## 2018-04-11 PROCEDURE — 85018 HEMOGLOBIN: CPT

## 2018-04-11 PROCEDURE — 36430 TRANSFUSION BLD/BLD COMPNT: CPT

## 2018-04-11 PROCEDURE — 85730 THROMBOPLASTIN TIME PARTIAL: CPT

## 2018-04-11 RX ORDER — METFORMIN HYDROCHLORIDE 850 MG/1
1 TABLET ORAL
Qty: 60 | Refills: 0 | OUTPATIENT
Start: 2018-04-11 | End: 2018-05-10

## 2018-04-11 RX ORDER — FUROSEMIDE 40 MG
1 TABLET ORAL
Qty: 0 | Refills: 0 | COMMUNITY

## 2018-04-11 RX ORDER — METOPROLOL TARTRATE 50 MG
1 TABLET ORAL
Qty: 60 | Refills: 0 | OUTPATIENT
Start: 2018-04-11 | End: 2018-05-10

## 2018-04-11 RX ORDER — METFORMIN HYDROCHLORIDE 850 MG/1
1 TABLET ORAL
Qty: 0 | Refills: 0 | COMMUNITY

## 2018-04-11 RX ORDER — WARFARIN SODIUM 2.5 MG/1
1 TABLET ORAL
Qty: 7 | Refills: 0 | OUTPATIENT
Start: 2018-04-11 | End: 2018-04-17

## 2018-04-11 RX ORDER — ATORVASTATIN CALCIUM 80 MG/1
1 TABLET, FILM COATED ORAL
Qty: 30 | Refills: 0 | OUTPATIENT
Start: 2018-04-11 | End: 2018-05-10

## 2018-04-11 RX ORDER — METOPROLOL TARTRATE 50 MG
1 TABLET ORAL
Qty: 0 | Refills: 0 | COMMUNITY

## 2018-04-11 RX ORDER — ATORVASTATIN CALCIUM 80 MG/1
40 TABLET, FILM COATED ORAL AT BEDTIME
Qty: 0 | Refills: 0 | Status: DISCONTINUED | OUTPATIENT
Start: 2018-04-11 | End: 2018-04-11

## 2018-04-11 RX ORDER — WARFARIN SODIUM 2.5 MG/1
1 TABLET ORAL
Qty: 0 | Refills: 0 | COMMUNITY

## 2018-04-11 RX ADMIN — Medication 1: at 11:17

## 2018-04-11 RX ADMIN — SODIUM CHLORIDE 3 MILLILITER(S): 9 INJECTION INTRAMUSCULAR; INTRAVENOUS; SUBCUTANEOUS at 05:54

## 2018-04-11 RX ADMIN — Medication 1 PACKET(S): at 09:08

## 2018-04-11 RX ADMIN — SODIUM CHLORIDE 3 MILLILITER(S): 9 INJECTION INTRAMUSCULAR; INTRAVENOUS; SUBCUTANEOUS at 12:33

## 2018-04-11 NOTE — DISCHARGE NOTE ADULT - ADDITIONAL INSTRUCTIONS
Please take your coumadin exactly as prescribed above, and do not miss any doses or take any extra doses. Please follow up with Cardiologist Dr Block for an INR check on Friday 4/13 at 9:30am (an appointment has already made for you).  Take all medications and diet as prescribed above, and follow up with your PMD within 1-2 weeks of discharge. Please take your coumadin exactly as prescribed above, and do not miss any doses or take any extra doses. Please follow up with Cardiologist Dr Block for an INR check on Friday 4/13 at 9:30am (an appointment has already made for you).  Take all medications and diet as prescribed above, and follow up with your Primary care physician in 3-5 days from discharge.

## 2018-04-11 NOTE — PROGRESS NOTE ADULT - PROBLEM SELECTOR PLAN 2
Anticoagulation as above with Coumadin 2 mg given last night, f/u morning INR  Continue rate control with Lopressor 25mg po bid Anticoagulation as above with Coumadin 2 mg given last night, morning INR subtherapeutic at 1.33, will continue with 2.5mg coumadin and monitor  Continue rate control with Lopressor 25mg po bid Rate control  Anticoagulation as above with Coumadin 2 mg given last night, morning INR subtherapeutic at 1.33, will continue with 2.5mg coumadin and monitor  Continue rate control with Lopressor 25mg po bid

## 2018-04-11 NOTE — PROGRESS NOTE ADULT - PROBLEM SELECTOR PLAN 1
Patient has been on coumadin for 7 years and states that her coumadin has recently been changed from 2.5 mg to 7.5 mg without having her INR checked for weeks.  Pt with supra-therapeutic INR (> 15) on presentation, s/p 2 units fresh frozen plasma. Repeat INR was 2.19, so gave patient 2 mg coumadin last night  f/u morning INR  Head CT negative for gross acute intracranial hemorrhage or mass effect Patient has been on coumadin for 7 years and states that her coumadin has recently been changed from 2.5 mg to 7.5 mg without having her INR checked for weeks. Pt has hx of non-compliance with her INR checks.   Pt with supra-therapeutic INR (> 15) on presentation, s/p 2 units fresh frozen plasma. Repeat INR was 2.19, so gave patient 2 mg coumadin last night  INR this morning 1.33  Will continue with 2.5mg coumadin tonight  Head CT negative for gross acute intracranial hemorrhage or mass effect Patient has been on coumadin for 7 years and states that her coumadin has recently been changed from 2.5 mg to 7.5 mg without having her INR checked for weeks. Pt has hx of non-compliance with her INR checks.   Pt with supra-therapeutic INR (> 15) on presentation, s/p 2 units fresh frozen plasma. Repeat INR was 2.19, so gave patient 2 mg coumadin last night  INR this morning 1.33  Will continue with 2.5mg coumadin tonight  Head CT negative for gross acute intracranial hemorrhage or mass effect  -D/w [facility] [place] Dr. Taylor the plan of care who agrees with above dosage of coumadin until pt is seen in the office.

## 2018-04-11 NOTE — PROGRESS NOTE ADULT - PROBLEM SELECTOR PLAN 4
HbA1c  Accuchecks  Insulin sliding scale HbA1c 5.9  Accuchecks  Insulin sliding scale  Hold home med metformin while at the hospital HbA1c 5.9  Accuchecks  Insulin sliding scale  Hold home med metformin while at the hospital. Could possibly come off metformin therapy, advised pt to follow up with pmd for further management.

## 2018-04-11 NOTE — DISCHARGE NOTE ADULT - PATIENT PORTAL LINK FT
You can access the [facility] Patient Portal, offered by [facility], by registering with the following website: [email or web address]

## 2018-04-11 NOTE — PROGRESS NOTE ADULT - SUBJECTIVE AND OBJECTIVE BOX
Patient is a 86y old  Female who presents with a chief complaint of Supra-therapeutic INR (10 Apr 2018 03:13)    Patient seen and examined at bedside. No acute overnight events. Patient refused the CTA last night and is currently refusing the morning blood draws. She wants to leave AMA when her  comes to see her later today. She denies chest pain, palpitations, headaches, nausea, vomiting, nose bleeds, rectal bleeds, dark stools.     ROS: as per HPI    Vital Signs Last 24 Hrs  T(C): 36.7 (11 Apr 2018 04:29), Max: 36.8 (10 Apr 2018 16:25)  T(F): 98 (11 Apr 2018 04:29), Max: 98.2 (10 Apr 2018 16:25)  HR: 76 (11 Apr 2018 04:29) (76 - 108)  BP: 118/72 (11 Apr 2018 04:29) (114/76 - 123/79)  RR: 18 (11 Apr 2018 04:29) (17 - 20)  SpO2: 95% (11 Apr 2018 04:29) (95% - 97%)    Physical Exam:  General: NAD, elderly female, well nourished, sitting comfortably in chair  HEENT: NC/AT, PERRLA, EOMI, throat non-erythematous, dry mucus membranes  Neck: supple, no lymphadenopathy  Lungs: CTA bilaterally, no rhonchi, no wheezes, no crackles  Cardio: S1S2+, irregularly irregular, no murmurs  Abdominal: soft, BS+ normoactive, non-distended, non-tender  Extremities: mild left ankle swelling, ROM intact, no calf tenderness, RUE antecubital ecchymosis with slight hematoma around prior IV site with no active bleeding noted  Neuro: AAOx3, no focal deficits    Labs: patient refused morning lab draws    MEDICATIONS  (STANDING):  dextrose 5%. 1000 milliLiter(s) (50 mL/Hr) IV Continuous <Continuous>  dextrose 50% Injectable 12.5 Gram(s) IV Push once  dextrose 50% Injectable 25 Gram(s) IV Push once  dextrose 50% Injectable 25 Gram(s) IV Push once  insulin lispro (HumaLOG) corrective regimen sliding scale   SubCutaneous Before meals and at bedtime  metoprolol tartrate 25 milliGRAM(s) Oral two times a day  potassium phosphate / sodium phosphate powder 1 Packet(s) Oral once  sodium chloride 0.9% lock flush 3 milliLiter(s) IV Push every 8 hours    MEDICATIONS  (PRN):  dextrose Gel 1 Dose(s) Oral once PRN Blood Glucose LESS THAN 70 milliGRAM(s)/deciliter  glucagon  Injectable 1 milliGRAM(s) IntraMuscular once PRN Glucose LESS THAN 70 milligrams/deciliter

## 2018-04-11 NOTE — DISCHARGE NOTE ADULT - INSTRUCTIONS
Follow the DASH Diet: Dietary Approaches to Stop Hypertension) is a dietary pattern promoted by the U[place]S.-based National Heart, Lung, and [employer] [place] [part of the National Institutes of Health ("NIH"), an agency of the United States Department of Health and Human Services] to prevent and control hypertension. The DASH diet is rich in fruits, vegetables, whole grains, and low-fat dairy foods; includes meat, fish, poultry, nuts, and beans; and is limited in sugar-sweetened foods and beverages, red meat, and added fats. In addition to its effect on blood pressure, it is designed to be a well-balanced approach to eating for the general public. DASH is recommended by the United [place] Department of Agriculture ("USDA") as one of its ideal eating plans for all Americans.

## 2018-04-11 NOTE — DISCHARGE NOTE ADULT - MEDICATION SUMMARY - MEDICATIONS TO CHANGE
I will SWITCH the dose or number of times a day I take the medications listed below when I get home from the hospital:    Coumadin 7.5 mg oral tablet  -- 1 tab(s) by mouth once a day

## 2018-04-11 NOTE — PROGRESS NOTE ADULT - PROBLEM SELECTOR PLAN 3
EKG showing non specific ST changes  Negative CT head  Carotid US with no significant stenosis  Incidental finding on TTE of moderate aortic valve stenosis and mobile echodensity in the ascending aorta appears to be artifactual vs mobile plaque  [facility] consulted  Pt needs CTA for further eval, but she refused test; risks and benefits explained to pt and she verbalized understanding and currently still refusing test EKG showing non specific ST changes  Negative CT head  Carotid US with no significant stenosis  Incidental finding on TTE of moderate aortic valve stenosis and mobile echodensity in the ascending aorta appears to be artifactual vs mobile plaque  [facility] consulted  Pt needs CTA for further eval, but she refused test; risks and benefits explained to pt and she verbalized understanding and currently still refusing test  As per Cardio, patient is stable for discharge and CTA to be done as outpatient. EKG showing non specific ST changes  Negative CT head  Carotid US with no significant stenosis  Incidental finding on TTE of moderate aortic valve stenosis and mobile echodensity in the ascending aorta appears to be artifactual vs mobile plaque  [facility] consulted  Pt needs CTA for further eval, but she refused test; risks and benefits explained to pt and she verbalized understanding and currently still refusing test  As per Cardio, Dr. Taylor patient is stable for discharge and CTA vs ZEFERINO to be done as outpatient. Requested the patient to be maintained on statin and coumadin.

## 2018-04-11 NOTE — DISCHARGE NOTE ADULT - MEDICATION SUMMARY - MEDICATIONS TO TAKE
I will START or STAY ON the medications listed below when I get home from the hospital:    Coumadin 2.5 mg oral tablet  -- 1 tab(s) by mouth once a day (at bedtime)   -- Do not take this drug if you are pregnant.  It is very important that you take or use this exactly as directed.  Do not skip doses or discontinue unless directed by your doctor.  Obtain medical advice before taking any non-prescription drugs as some may affect the action of this medication.    -- Indication: For Atrial fibrillation    metFORMIN 500 mg oral tablet  -- 1 tab(s) by mouth 2 times a day  -- Indication: For Diabetes    atorvastatin 40 mg oral tablet  -- 1 tab(s) by mouth once a day (at bedtime)  -- Indication: For Hyperlipidemia     metoprolol tartrate 25 mg oral tablet  -- 1 tab(s) by mouth 2 times a day  -- Indication: For Hypertension and atrial fibrillation

## 2018-04-11 NOTE — DISCHARGE NOTE ADULT - OTHER SIGNIFICANT FINDINGS
< from: TTE Echo Complete w/Doppler (04.09.18 @ 16:20) >  Summary:   1. Technically limited study.   2. Endocardial visualization was enhanced with intravenous echo contrast.   3. Normal left ventricular internal cavity size.   4. Normal global left ventricular systolic function.   5. Left ventricular ejection fraction, by visual estimation, is 65%.   6. The mitral in-flow pattern reveals no discernable A-wave, therefore   no comment on diastolic function can be made.   7. Normal right ventricular size and function.   8. Thickening of the anterior and posterior mitral valve leaflets.   9. Moderate aortic valve stenosis.  10. Mobile echodensity in the ascending aorta now well visualized,   apprears to be artifactual vs mobile plaque. Consider ZEFERINO or CTA for   further evaluation if clinically indicated.  11. Dilatation of the ascending aorta.  12. Mild mitral annular calcification.  13. Mild mitral valve regurgitation.    < from: CT Head No Cont (04.10.18 @ 01:09) >  EXAM:  CT BRAIN                        PROCEDURE DATE:  04/10/2018      FINDINGS: Limited by motion artifact. Possible small right cerebellar   hemisphere lacunar infarct.  There is periventricular and subcortical white matter hypodensity without   mass effect, nonspecific, likely representing mild chronic microvascular   ischemic changes. There is no compelling evidence for an acute   transcortical infarction. There is no evidence of mass, mass effect,   midline shift or extra-axial fluid collection. The lateral ventricles and   cortical sulci are age-appropriate in size and configuration. The orbits,   mastoid air cells and visualized paranasal sinuses are unremarkable. The   calvarium is intact.    IMPRESSION:  Limited by motion artifact. Mild chronic microvascular   changes without evidence of an acute transcortical infarction or   hemorrhage. MR is a more sensitive imaging modality for the evaluation of   an acute infarction. Preliminary report provided by ANJEL RESENDIZ D.O.; ATTENDING RADIOLOGIST     < from: US Duplex Carotid Arteries Complete, Bilateral (04.10.18 @ 15:55) >   EXAM:  US DPLX CAROTIDS COMPL BI                          PROCEDURE DATE:  04/10/2018      INTERPRETATION:  History:  Syncope    Findings:   The common carotid, internal carotid, external carotid, and vertebral   arteries are patent with normal direction of flow.        Right:  No significant plaque is identified.  No subjective stenosis. Velocities   and waveforms within normal limits.        Left:  Mild calcified left bulbar plaque No subjective stenosis. Velocities and   waveforms within normal limits.        Impression: No hemodynamically significant stenosis.                         10.4   6.6   )-----------( 233      ( 11 Apr 2018 11:06 )             31.9   04-11    140  |  98  |  18.0  ----------------------------<  165<H>  4.2   |  27.0  |  0.57    Ca    10.2      11 Apr 2018 11:06  Phos  3.2     04-11  Mg     2.0     04-11    TPro  6.8  /  Alb  3.9  /  TBili  2.2<H>  /  DBili  x   /  AST  17  /  ALT  11  /  AlkPhos  70  04-10      PT/INR - ( 11 Apr 2018 11:06 )   PT: 14.7 sec;   INR: 1.33 ratio    PTT - ( 10 Apr 2018 07:50 )  PTT:41.3 sec

## 2018-04-11 NOTE — DISCHARGE NOTE ADULT - PROVIDER TOKENS
FREE:[LAST:[Dr Velázquez],PHONE:[(   )    -],FAX:[(   )    -],ADDRESS:[Address: [address] [place] [address]  Phone: [phone]]],TOKEN:'[id number]'

## 2018-04-11 NOTE — DISCHARGE NOTE ADULT - HOSPITAL COURSE
84 y/o F w/ HX of A-fib (on coumadin), DM-2 & HTN, recently left AMA from ER day prior to current hospitalization s/p syncopal episode refusing full work up; currently BIBA to ED presenting w/ RUE wound found to have supra-therapeutic INR (>15) S/P 2 FFP with down trending of her INR to 2.19. Upon further d/w patients outpt PMD Dr Velázquez patient was last seen in the office in January 2018. During that visit, patient was seen by a PA, INR was 2.6, and she was prescribed coumadin 7.5mg on 1/9/2018 (90 pills, 1 refill) by the PA. Patient was non-compliant with her INR checks and has not been seen by her PMD since the visit in Jan 2018. Patient reports non compliance and felt it was not necessary to check her INRs. D/w patient that we will start low dose coumadin 2.5mg and that she will need appropriate follow up to ensure INR remains in the correct range. Pt counseled on better compliance and follow up with the medication and daughter reports that she will monitor her mother closely. Follow up appt with new cardiologist Dr. Block ([place]) made for this Friday for INR check. Syncope work up with ekg showing non specific st changes, negative ct head, carotid US with no significant stenosis but incidental finding on tte of moderate aortic valve stenosis and mobile echodensity in the ascending aorta apprears to be artifactual vs mobile plaque, which may need further evaluation with cta as outpatient. As per Cardio, patient is stable for discharge.  Please take your coumadin exactly as prescribed above, and do not miss any doses or take any extra doses. Please follow up with Cardiologist Dr Block for an INR check on Friday 4/13 at 9:30am (an appointment has already made for you). 86 y/o F w/ HX of A-fib (on coumadin), DM-2 & HTN, recently left AMA from ER day prior to current hospitalization s/p syncopal episode refusing full work up; currently BIBA to ED presenting w/ RUE wound found to have supra-therapeutic INR (>15) s/p 2 FFP with down trending of her INR to 2.19. Upon further d/w patients outpt PMD Dr Velázquez patient was last seen in the office in January 2018. During that visit, patient was seen by a PA, INR was 2.6, and she was prescribed coumadin 7.5mg on 1/9/2018 (90 pills, 1 refill) by the PA. Patient was non-compliant with her INR checks and has not been seen by her PMD since the visit in Jan 2018. Patient reports non compliance and felt it was not necessary to check her INRs. D/w patient that we will start low dose coumadin 2.5mg and that she will need appropriate follow up to ensure INR remains in the correct range. Pt counseled on better compliance and follow up with the medication and daughter reports that she will monitor her mother closely. Follow up appt with new cardiologist Dr. Block ([place]) made for this Friday for INR check. INR today 1.33, will continue with 2.5mg coumadin for now till further follow up with Dr Block. Syncope work up with ekg showing non specific st changes, negative ct head, carotid US with no significant stenosis but incidental finding on tte of moderate aortic valve stenosis and mobile echodensity in the ascending aorta appears to be artifactual vs mobile plaque, which may need further evaluation with cta as outpatient. As per Cardio, patient is stable for discharge. Patient is medically optimized for discharge to home. Follow up with Cardiologist Dr Block for an INR check on Friday 4/13 at 9:30am (an appointment has already made for you).    time spent on discharge: 45 mins.

## 2018-04-11 NOTE — DISCHARGE NOTE ADULT - CARE PLAN
Principal Discharge DX:	INR (international normal ratio) abnormal  Goal:	resolved  Assessment and plan of treatment:	Your INR was very high when you came to the hospital, likely because your Coumadin dose was too high. Please take coumadin 2.5mg once daily at bedtime. Take this medication exactly as prescribed and do not miss any doses or take extra doses.   Please follow up with Cardiologist Dr Block for an INR check on Friday 4/13 at 9:30am (an appointment has already made for you).  If experiencing any episodes of bleeding including severe nose bleeds, bloody urine, bloody stools, dark tarry stools, please seek medical attention immediately.   Be consistent with your diet particularly with foods high in vitamin K such as: broccoli, cabbage, brussel sprouts, green onions, avocado, iceberg lettuce, green leaf lettuces, spinach, turnip greens, parsley, endive, kale, susanna greens, beef liver, pork liver, mayonnaise, margarine, canola oil, soybean oil. You can continue to eat these, but be consistent. Do not change your diet or go on a diet without contacting your doctor as this can effect how coumadin works.  Secondary Diagnosis:	Atrial fibrillation  Goal:	stable  Assessment and plan of treatment:	Atrial fibrillation is the most common heart rhythm problem. It puts you at an increased risk for stroke. Please take coumadin 2.5mg once daily, exactly as prescribed above, and follow up with Cardiology Dr Rueda on Friday 4/13 at 9:30am for INR check.   Continue taking Lopressor 25mg oral twice daily.  Please seek medical attention if you feel your heart racing or beating unsually, chest tightness or pain, lightheaded, faint, or short of breath especially with exercise.  Secondary Diagnosis:	Syncope, unspecified syncope type  Goal:	stable  Assessment and plan of treatment:	You had an incidental finding on your echocardiogram of moderate aortic valve stenosis and mobile echodensity in the ascending aorta. Please follow up with Cardiology Dr Block as outpatient for further workup.   Please call your doctor if you are experiencing another episode of fainting, lightheadedness, or loss of consciousness.  Secondary Diagnosis:	Essential hypertension  Goal:	stable  Assessment and plan of treatment:	Continue taking Lopressor 25mg oral twice daily.  Please check your Blood pressure at home and if the top number is always over 150 or if the bottom number is always over 100, please call your doctor.   Please hold for SBP less than 100 or for HR less than 60.  Please seek medical attention if experiencing chest tightness or pain, shortness of breath, new vision changes or very severe headaches.  Secondary Diagnosis:	Diabetes  Goal:	stable  Assessment and plan of treatment:	Your HbA1c is 5.9.   Continue taking metformin 500mg oral twice daily.   Follow up with your PMD Dr Velázquez within 1-2 weeks of discharge.   Follow low fat/carb diet. Principal Discharge DX:	INR (international normal ratio) abnormal  Goal:	resolved  Assessment and plan of treatment:	Your INR was very high when you came to the hospital, likely because your Coumadin dose was too high. Please take coumadin 2.5mg once daily at bedtime. Take this medication exactly as prescribed and do not miss any doses or take extra doses.   Please follow up with Cardiologist Dr Block for an INR check on Friday 4/13 at 9:30am (an appointment has already made for you).  If experiencing any episodes of bleeding including severe nose bleeds, bloody urine, bloody stools, dark tarry stools, please seek medical attention immediately.   Be consistent with your diet particularly with foods high in vitamin K such as: broccoli, cabbage, brussel sprouts, green onions, avocado, iceberg lettuce, green leaf lettuces, spinach, turnip greens, parsley, endive, kale, susanna greens, beef liver, pork liver, mayonnaise, margarine, canola oil, soybean oil. You can continue to eat these, but be consistent. Do not change your diet or go on a diet without contacting your doctor as this can effect how coumadin works.  Secondary Diagnosis:	Atrial fibrillation  Goal:	stable  Assessment and plan of treatment:	Atrial fibrillation is the most common heart rhythm problem. It puts you at an increased risk for stroke. Please take coumadin 2.5mg once daily, exactly as prescribed above, and follow up with Cardiology Dr Rueda on Friday 4/13 at 9:30am for INR check.   Continue taking Lopressor 25mg oral twice daily.  Please seek medical attention if you feel your heart racing or beating unsually, chest tightness or pain, lightheaded, faint, or short of breath especially with exercise.  Secondary Diagnosis:	Syncope, unspecified syncope type  Goal:	stable  Assessment and plan of treatment:	You had an incidental finding on your echocardiogram of moderate aortic valve stenosis and mobile echodensity in the ascending aorta. Please follow up with Cardiology Dr Block as outpatient for further workup.   Please call your doctor if you are experiencing another episode of fainting, lightheadedness, or loss of consciousness.  Secondary Diagnosis:	Essential hypertension  Goal:	stable  Assessment and plan of treatment:	Continue taking Lopressor 25mg oral twice daily.  Please check your Blood pressure at home and if the top number is always over 150 or if the bottom number is always over 100, please call your doctor.   Please hold for SBP less than 100 or for HR less than 60.  Please seek medical attention if experiencing chest tightness or pain, shortness of breath, new vision changes or very severe headaches.  Secondary Diagnosis:	Diabetes  Goal:	stable  Assessment and plan of treatment:	Your HbA1c is 5.9.   Continue taking metformin 500mg oral twice daily. Please follow up with your primary care physician to see if you can stop taking this medication given your HBA1C is better controlled.   Follow up with your primary care physician in 3-5 days from discharge.   Follow low fat/carb diet.

## 2018-04-11 NOTE — DISCHARGE NOTE ADULT - CARE PROVIDER_API CALL
Dr Velázquez,   Address: [address] [place] [address]  Phone: [phone]  Phone: (   )    -  Fax: (   )    -    Boubacar Block (MD), Internal Medicine  [address] [place] [address]  Phone: [phone]  Fax: [phone]

## 2018-04-11 NOTE — PROGRESS NOTE ADULT - ASSESSMENT
86 y/o F w/ HX of A-fib (on coumadin), DM-2 & HTN, recently left AMA from ER day prior to current hospitalization s/p syncopal episode refusing full work up; currently BIBA to ED presenting w/ RUE wound found to have supra-therapeutic INR (>15) S/P 2 FFP with down trending of her INR to 2.19. Upon further d/w patients outpt PMD Dr Velázquez patient was last seen in the office in January 2018. During that visit, patient was seen by a PA, INR was 2.6, and she was prescribed coumadin 7.5mg on 1/9/2018 (90 pills, 1 refill) by the PA. Patient was non-compliant with her INR checks and has not been seen by her PMD since the visit in Jan 2018. Hb measured at that visit was 14.3. Patient reports non compliance and felt it was not necessary to check her INRs. D/w patient that we will start low dose coumadin and that she will need appropriate follow up to ensure INR remains in the correct range. Pt repeated wanting to leave AMA, risks and adverse effects explained to her and patient verbalized understanding. Pt counseled on better compliance and follow up with the medication and daughter reports that she will monitor her mother closely. Follow up appt with new cardiologist Dr. Block made for this Friday for INR check. Syncope work up with ekg showing non specific st changes, negative ct head, carotid US with no significant stenosis but incidental finding on tte of moderate aortic valve stenosis and mobile echodensity in the ascending aorta apprears to be artifactual vs mobile plaque, which may need further evaluation with cta, but patient refused test. Cardiology consulted. 86 y/o F w/ HX of A-fib (on coumadin), DM-2 & HTN, recently left AMA from ER day prior to current hospitalization s/p syncopal episode refusing full work up; currently BIBA to ED presenting w/ RUE wound found to have supra-therapeutic INR (>15) S/P 2 FFP with down trending of her INR to 2.19. Upon further d/w patients outpt PMD Dr Velázquez patient was last seen in the office in January 2018. During that visit, patient was seen by a PA, INR was 2.6, and she was prescribed coumadin 7.5mg on 1/9/2018 (90 pills, 1 refill) by the PA. Patient was non-compliant with her INR checks and has not been seen by her PMD since the visit in Jan 2018. Patient reports non compliance and felt it was not necessary to check her INRs. D/w patient that we will start low dose coumadin 2.5mg and that she will need appropriate follow up to ensure INR remains in the correct range. Pt counseled on better compliance and follow up with the medication and daughter reports that she will monitor her mother closely. Follow up appt with new cardiologist Dr. Block ([place]) made for this Friday for INR check. Syncope work up with ekg showing non specific st changes, negative ct head, carotid US with no significant stenosis but incidental finding on tte of moderate aortic valve stenosis and mobile echodensity in the ascending aorta apprears to be artifactual vs mobile plaque, which may need further evaluation with cta as outpatient. As per Cardio, patient is stable for discharge. 86 y/o F w/ HX of A-fib (on coumadin), DM-2 & HTN, recently left AMA from ER day prior to current hospitalization s/p syncopal episode refusing full work up; currently BIBA to ED presenting w/ RUE wound found to have supra-therapeutic INR (>15) s/p 2 FFP with down trending of her INR to 2.19. Upon further d/w patients outpt PMD Dr Velázquez patient was last seen in the office in January 2018. During that visit, patient was seen by a PA, INR was 2.6, and she was prescribed coumadin 7.5mg on 1/9/2018 (90 pills, 1 refill) by the PA. Patient was non-compliant with her INR checks and has not been seen by her PMD since the visit in Jan 2018. Patient reports non compliance and felt it was not necessary to check her INRs. D/w patient that we will start low dose coumadin 2.5mg and that she will need appropriate follow up to ensure INR remains in the correct range. Pt counseled on better compliance and follow up with the medication and daughter reports that she will monitor her mother closely. Follow up appt with new cardiologist Dr. Block ([place]) made for this Friday for INR check. INR today 1.33, will continue with 2.5mg coumadin for now till further follow up with Dr Block. Syncope work up with ekg showing non specific st changes, negative ct head, carotid US with no significant stenosis but incidental finding on tte of moderate aortic valve stenosis and mobile echodensity in the ascending aorta appears to be artifactual vs mobile plaque, which may need further evaluation with cta as outpatient. As per Cardio, patient is stable for discharge. Patient is medically optimized for discharge to home. Follow up with Cardiologist Dr Block for an INR check on Friday 4/13 at 9:30am (an appointment has already made for you).

## 2018-04-11 NOTE — DISCHARGE NOTE ADULT - PLAN OF CARE
Your INR was very high when you came to the hospital, likely because your Coumadin dose was too high. Please take coumadin 2.5mg once daily at bedtime. Take this medication exactly as prescribed and do not miss any doses or take extra doses.   Please follow up with Cardiologist Dr Block for an INR check on Friday 4/13 at 9:30am (an appointment has already made for you).  If experiencing any episodes of bleeding including severe nose bleeds, bloody urine, bloody stools, dark tarry stools, please seek medical attention immediately.   Be consistent with your diet particularly with foods high in vitamin K such as: broccoli, cabbage, brussel sprouts, green onions, avocado, iceberg lettuce, green leaf lettuces, spinach, turnip greens, parsley, endive, kale, susanna greens, beef liver, pork liver, mayonnaise, margarine, canola oil, soybean oil. You can continue to eat these, but be consistent. Do not change your diet or go on a diet without contacting your doctor as this can effect how coumadin works. stable Atrial fibrillation is the most common heart rhythm problem. It puts you at an increased risk for stroke. Please take coumadin 2.5mg once daily, exactly as prescribed above, and follow up with Cardiology Dr Rueda on Friday 4/13 at 9:30am for INR check.   Continue taking Lopressor 25mg oral twice daily.  Please seek medical attention if you feel your heart racing or beating unsually, chest tightness or pain, lightheaded, faint, or short of breath especially with exercise. You had an incidental finding on your echocardiogram of moderate aortic valve stenosis and mobile echodensity in the ascending aorta. Please follow up with Cardiology Dr Block as outpatient for further workup.   Please call your doctor if you are experiencing another episode of fainting, lightheadedness, or loss of consciousness. Continue taking Lopressor 25mg oral twice daily.  Please check your Blood pressure at home and if the top number is always over 150 or if the bottom number is always over 100, please call your doctor.   Please hold for SBP less than 100 or for HR less than 60.  Please seek medical attention if experiencing chest tightness or pain, shortness of breath, new vision changes or very severe headaches. Your HbA1c is 5.9.   Continue taking metformin 500mg oral twice daily.   Follow up with your PMD Dr Velázquez within 1-2 weeks of discharge.   Follow low fat/carb diet. resolved Your HbA1c is 5.9.   Continue taking metformin 500mg oral twice daily. Please follow up with your primary care physician to see if you can stop taking this medication given your HBA1C is better controlled.   Follow up with your primary care physician in 3-5 days from discharge.   Follow low fat/carb diet.

## 2018-05-27 ENCOUNTER — EMERGENCY (EMERGENCY)
Facility: HOSPITAL | Age: 83
LOS: 1 days | Discharge: HOME | End: 2018-05-27
Attending: EMERGENCY MEDICINE
Payer: MEDICARE

## 2018-05-27 VITALS
SYSTOLIC BLOOD PRESSURE: 163 MMHG | DIASTOLIC BLOOD PRESSURE: 84 MMHG | TEMPERATURE: 98 F | HEART RATE: 82 BPM | RESPIRATION RATE: 20 BRPM | OXYGEN SATURATION: 96 %

## 2018-05-27 VITALS — HEIGHT: 63 IN | WEIGHT: 160.06 LBS

## 2018-05-27 PROCEDURE — 73090 X-RAY EXAM OF FOREARM: CPT

## 2018-05-27 PROCEDURE — 29125 APPL SHORT ARM SPLINT STATIC: CPT

## 2018-05-27 PROCEDURE — 73110 X-RAY EXAM OF WRIST: CPT

## 2018-05-27 PROCEDURE — 73130 X-RAY EXAM OF HAND: CPT

## 2018-05-27 PROCEDURE — 29125 APPL SHORT ARM SPLINT STATIC: CPT | Mod: RT

## 2018-05-27 PROCEDURE — 73110 X-RAY EXAM OF WRIST: CPT | Mod: 26,RT

## 2018-05-27 PROCEDURE — 73130 X-RAY EXAM OF HAND: CPT | Mod: 26,RT

## 2018-05-27 PROCEDURE — 99284 EMERGENCY DEPT VISIT MOD MDM: CPT | Mod: 25

## 2018-05-27 PROCEDURE — 73090 X-RAY EXAM OF FOREARM: CPT | Mod: 26,LT

## 2018-05-27 NOTE — ED PROVIDER NOTE - MEDICAL DECISION MAKING DETAILS
86 year old femal on asa 81 mg and coumadin 2.5  foosh 4 days ago.  pain and swelling over Rwrist and hand.  xray for evaluation of fracture

## 2018-05-27 NOTE — ED PROVIDER NOTE - CONSTITUTIONAL, MLM
normal... elderly female, Well appearing, well nourished, awake, alert, oriented to person, place, time/situation and in no apparent distress.

## 2018-05-27 NOTE — ED PROCEDURE NOTE - CPROC ED POST PROC CARE GUIDE1
Verbal/written post procedure instructions were given to patient/caregiver./Instructed patient/caregiver to follow-up with primary care physician./Elevate the injured extremity as instructed./Keep the cast/splint/dressing clean and dry./Instructed patient/caregiver regarding signs and symptoms of infection.

## 2018-05-27 NOTE — ED ADULT NURSE NOTE - OBJECTIVE STATEMENT
Assumed pt care at 1450.  Pt states she fell on Thursday In [place] onto R arm.  Pt states it is now bruised and unsure if she broke it.  Denies any c/o pain, visible bruising to hand/arm, no acute s/s of respiratory distress noted or reported will continue to monitor

## 2018-05-27 NOTE — ED PROVIDER NOTE - ATTENDING CONTRIBUTION TO CARE
fell off stool and sustained a FOOSH injury tenderness over wrist  x-ray shows impacted fracture and fracture ulnar styloid  will splint refer to ortho as an outpatient  Agree with acps assessment hx and physical

## 2018-05-27 NOTE — ED PROVIDER NOTE - OBJECTIVE STATEMENT
86 year old female, states that she was in [facility] this past week, was trying to sit in a swivel chair and missed tried to catch herself from falling and landed on her right hand. denies head trauma, loc, nausea or vomiting. pt takes daily baby aspirin.  pt states that her hotel placed her in a splint and sling but that she did not want to go to the hospital because she was going to be going home. pt states that her hand is swollen and bruised and that she has pain over the volar aspect of the hand. denies numbness or tingling of the fingers. pain with movement of the wrist. pt states that she has taken tylenol with some relief. last dose 1030 today

## 2018-05-27 NOTE — ED PROVIDER NOTE - CARE PLAN
Assessment and plan of treatment:	86 year old femal on asa 81 mg and coumadin 2.5  foosh 4 days ago.  pain and swelling over Rwrist and hand.  xray for evaluation of fracture Principal Discharge DX:	Radius fracture  Assessment and plan of treatment:	86 year old femal on asa 81 mg and coumadin 2.5  foosh 4 days ago.  pain and swelling over Rwrist and hand.  xray for evaluation of fracture  Secondary Diagnosis:	Ulnar fracture

## 2018-05-27 NOTE — ED ADULT TRIAGE NOTE - CHIEF COMPLAINT QUOTE
fell on friday in [place], went to emergency room and refused xrays, presents today with swelling to right hand and bruising

## 2018-05-27 NOTE — ED PROVIDER NOTE - PHYSICAL EXAMINATION
RUE: pt has on prefabricated thumb spica splint and sling. both removed for exam  + swelling and ecchymosis over there dorsal aspect of the right hand. phalanges without swelling or apparent deformities. ecchymosis extends proximal to the wrist with some yellowing of the skin up the forearm. skin is intact, no lacerations or abrasion. + TTP over volar aspect of the hand over area of ecchymosis on the metacarpals. 3/5  strength, decreased flexion and extension of wrist secondary to pain. full flexion and extension of digits 2-5. pain with flexion of thumb. sensation intact over the entire extremity. 2+ radial pulse.

## 2018-05-27 NOTE — ED ADULT NURSE NOTE - CHIEF COMPLAINT QUOTE
fell on friday in [place], went to emergency room and refused xrays, presents today with swelling to right hand and bruising

## 2019-04-25 ENCOUNTER — OUTPATIENT (OUTPATIENT)
Dept: OUTPATIENT SERVICES | Facility: HOSPITAL | Age: 84
LOS: 1 days | End: 2019-04-25

## 2019-04-25 ENCOUNTER — INPATIENT (INPATIENT)
Facility: HOSPITAL | Age: 84
LOS: 6 days | Discharge: NURSING, REHAB OR LONG-TERM CARE | End: 2019-05-02
Payer: MEDICARE

## 2019-04-25 PROCEDURE — 70450 CT HEAD/BRAIN W/O DYE: CPT | Mod: 26

## 2019-04-25 PROCEDURE — 71045 X-RAY EXAM CHEST 1 VIEW: CPT | Mod: 26

## 2019-04-25 PROCEDURE — 93010 ELECTROCARDIOGRAM REPORT: CPT

## 2019-04-25 PROCEDURE — 99285 EMERGENCY DEPT VISIT HI MDM: CPT

## 2019-04-26 ENCOUNTER — OUTPATIENT (OUTPATIENT)
Dept: OUTPATIENT SERVICES | Facility: HOSPITAL | Age: 84
LOS: 1 days | End: 2019-04-26

## 2019-04-27 ENCOUNTER — OUTPATIENT (OUTPATIENT)
Dept: OUTPATIENT SERVICES | Facility: HOSPITAL | Age: 84
LOS: 1 days | End: 2019-04-27

## 2019-04-28 ENCOUNTER — OUTPATIENT (OUTPATIENT)
Dept: OUTPATIENT SERVICES | Facility: HOSPITAL | Age: 84
LOS: 1 days | End: 2019-04-28

## 2019-04-28 PROCEDURE — 73562 X-RAY EXAM OF KNEE 3: CPT | Mod: 26,RT

## 2019-04-29 ENCOUNTER — OUTPATIENT (OUTPATIENT)
Dept: OUTPATIENT SERVICES | Facility: HOSPITAL | Age: 84
LOS: 1 days | End: 2019-04-29

## 2019-04-30 ENCOUNTER — OUTPATIENT (OUTPATIENT)
Dept: OUTPATIENT SERVICES | Facility: HOSPITAL | Age: 84
LOS: 1 days | End: 2019-04-30

## 2019-04-30 PROCEDURE — 71046 X-RAY EXAM CHEST 2 VIEWS: CPT | Mod: 26

## 2019-05-01 ENCOUNTER — OUTPATIENT (OUTPATIENT)
Dept: OUTPATIENT SERVICES | Facility: HOSPITAL | Age: 84
LOS: 1 days | End: 2019-05-01

## 2021-09-29 NOTE — PATIENT PROFILE ADULT. - SOCIAL CONCERNS
Christine Moreno is a 26 year old female who presents today because she woke up at 0300 with dysuria.  She has pain with urination, frequency.  She has had a UTI in the past but not for a very long time.  She did not have recent intercourse.  She does not have a fever or flank pain.      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  She feels that she has been very busy with the move and the stress of that, she may not be drinking enough water and she does hold her urine at times.     Review Of Systems   ROS: 10 point ROS neg other than the symptoms noted above in the HPI.    History reviewed. No pertinent past medical history.  Past Surgical History:   Procedure Laterality Date     wisdom teeth       Social History     Socioeconomic History     Marital status: Single     Spouse name: Not on file     Number of children: Not on file     Years of education: Not on file     Highest education level: Not on file   Occupational History     Not on file   Tobacco Use     Smoking status: Never Smoker     Smokeless tobacco: Never Used   Vaping Use     Vaping Use: Never used   Substance and Sexual Activity     Alcohol use: Yes     Comment: 4/week     Drug use: Never     Sexual activity: Yes     Partners: Male     Birth control/protection: Pill   Other Topics Concern     Not on file   Social History Narrative     Not on file     Social Determinants of Health     Financial Resource Strain:      Difficulty of Paying Living Expenses:    Food Insecurity:      Worried About Running Out of Food in the Last Year:      Ran Out of Food in the Last Year:    Transportation Needs:      Lack of Transportation (Medical):      Lack of Transportation (Non-Medical):    Physical Activity:      Days of Exercise per Week:      Minutes of Exercise per Session:    Stress:      Feeling of Stress :    Social Connections:      Frequency of Communication with Friends and Family:      Frequency of Social Gatherings with Friends and  "Family:      Attends Orthodoxy Services:      Active Member of Clubs or Organizations:      Attends Club or Organization Meetings:      Marital Status:    Intimate Partner Violence:      Fear of Current or Ex-Partner:      Emotionally Abused:      Physically Abused:      Sexually Abused:      Family History   Problem Relation Age of Onset     Multiple Sclerosis Mother      Coronary Artery Disease Father      Heart Disease Father        /75   Pulse 64   Temp 98.7  F (37.1  C) (Oral)   Ht 1.651 m (5' 5\")   Wt 54.5 kg (120 lb 3.2 oz)   SpO2 97%   BMI 20.00 kg/m      Exam:  Constitutional: healthy, alert and no distress  : UA positive for glucose, nitrites, protein and ketones.    Psychiatric: mentation appears normal and affect normal/bright      Assessment/Plan:  1. Dysuria    - UA without Microscopic; Future    - CBC with platelets differential; Future  - nitroFURantoin macrocrystal-monohydrate (MACROBID) 100 MG capsule; Take 1 capsule (100 mg) by mouth 2 times daily  Dispense: 14 capsule; Refill: 0    - Urine Culture Aerobic Bacterial; Future      2. Glucosuria    - Comprehensive metabolic panel; Future    - Hemoglobin A1c; Future    Because glucosuria was noted, we will further check for any glucose dysregulation.  Christine and I will be in touch tomorrow with those results.    Options for treatment and follow-up care were reviewed with the patient. Patient engaged in the decision making process and verbalized understanding of the options discussed and agreed with the final plan.    " None

## 2021-12-27 NOTE — ED PROVIDER NOTE - CONTEXT
All adult screening ordered and done appropriate for patient's age and gender and risk factors and complaints. Medication reviewed and renewed where needed and appropriate. Comply with medications. Monitor blood pressures and record at home.  Limit salt
unknown

## 2022-01-24 PROBLEM — I48.91 UNSPECIFIED ATRIAL FIBRILLATION: Chronic | Status: ACTIVE | Noted: 2018-04-11

## 2022-01-24 PROBLEM — I10 ESSENTIAL (PRIMARY) HYPERTENSION: Chronic | Status: ACTIVE | Noted: 2018-04-10

## 2022-01-24 PROBLEM — E11.9 TYPE 2 DIABETES MELLITUS WITHOUT COMPLICATIONS: Chronic | Status: ACTIVE | Noted: 2018-04-10

## 2023-03-23 NOTE — ED ADULT NURSE NOTE - NS ED PATIENT SAFETY CONCERN
Fluid needs deferred to team.   Estimated Needs using IBW + 10% (228 pounds) in setting of BMI >40. 
No

## 2023-12-21 NOTE — ED ADULT NURSE NOTE - PRIMARY CARE PROVIDER
Sunscreen Recommendations: SPF 30+
Detail Level: Detailed
Skin Checks Recommendations: watch for any lesion that does not resolve after a month, should come in for a check.
n/a

## 2024-08-21 NOTE — ED PROVIDER NOTE - DISPOSITION TYPE
Patient called, [facility] told her they never received a referral for home care.    Please resend the referral.    Please advise patient when this has been sent.      
Placed and refaxed [facility] order for home care.   
AMA